# Patient Record
Sex: FEMALE | URBAN - METROPOLITAN AREA
[De-identification: names, ages, dates, MRNs, and addresses within clinical notes are randomized per-mention and may not be internally consistent; named-entity substitution may affect disease eponyms.]

---

## 2020-12-10 ENCOUNTER — TELEPHONE (OUTPATIENT)
Dept: MATERNAL FETAL MEDICINE | Facility: CLINIC | Age: 30
End: 2020-12-10

## 2020-12-10 NOTE — TELEPHONE ENCOUNTER
Patient called MFM clinic to schedule consult. MFM RN spoke to patient and explained that her OB provider spoke to our on-call MFM on 12/9/20 and plan was that patient would not need to come in for MFM consult as recommendations had been discussed. Informed patient that MFM RN will reach out to [facility] OB Department to ensure that the plan has not changed and will call patient back with either decision.    Pt verbalized understanding of information.

## 2020-12-10 NOTE — TELEPHONE ENCOUNTER
Per Maria L with [facility] OB Department, patient does not need to see MFM for a consult. Patient was then contacted and informed that she would not need to see MFM but to follow-up with her scheduled [facility] OB appointments.    Pt verbalized understanding of information.